# Patient Record
Sex: FEMALE | Race: BLACK OR AFRICAN AMERICAN | Employment: OTHER | URBAN - METROPOLITAN AREA
[De-identification: names, ages, dates, MRNs, and addresses within clinical notes are randomized per-mention and may not be internally consistent; named-entity substitution may affect disease eponyms.]

---

## 2019-10-26 ENCOUNTER — IP HISTORICAL/CONVERTED ENCOUNTER (OUTPATIENT)
Dept: OTHER | Age: 64
End: 2019-10-26

## 2021-03-24 PROBLEM — E11.9 DIABETES MELLITUS TYPE 2, CONTROLLED (HCC): Status: ACTIVE | Noted: 2021-03-24

## 2021-03-24 PROBLEM — H93.8X9 EAR PRESSURE: Status: ACTIVE | Noted: 2021-03-24

## 2021-03-24 PROBLEM — N18.6 ESRD (END STAGE RENAL DISEASE) ON DIALYSIS (HCC): Status: ACTIVE | Noted: 2021-03-24

## 2021-03-24 PROBLEM — M10.9 GOUT: Status: ACTIVE | Noted: 2021-03-24

## 2021-03-24 PROBLEM — E78.00 HYPERCHOLESTEROLEMIA: Status: ACTIVE | Noted: 2021-03-24

## 2021-03-24 PROBLEM — I10 HTN (HYPERTENSION): Status: ACTIVE | Noted: 2021-03-24

## 2021-03-24 PROBLEM — Z86.69 HISTORY OF RECURRENT EAR INFECTION: Status: ACTIVE | Noted: 2021-03-24

## 2021-03-24 PROBLEM — J34.89 SINUS DRAINAGE: Status: ACTIVE | Noted: 2021-03-24

## 2021-03-24 PROBLEM — E66.9 OBESITY: Status: ACTIVE | Noted: 2021-03-24

## 2021-03-24 PROBLEM — Z99.2 ESRD (END STAGE RENAL DISEASE) ON DIALYSIS (HCC): Status: ACTIVE | Noted: 2021-03-24

## 2021-03-24 PROBLEM — M25.561 CHRONIC PAIN OF BOTH KNEES: Status: ACTIVE | Noted: 2021-03-24

## 2021-03-24 PROBLEM — G89.29 CHRONIC PAIN OF BOTH KNEES: Status: ACTIVE | Noted: 2021-03-24

## 2021-03-24 PROBLEM — K21.9 GERD (GASTROESOPHAGEAL REFLUX DISEASE): Status: ACTIVE | Noted: 2021-03-24

## 2021-03-24 PROBLEM — M25.562 CHRONIC PAIN OF BOTH KNEES: Status: ACTIVE | Noted: 2021-03-24

## 2021-03-24 RX ORDER — ATORVASTATIN CALCIUM 40 MG/1
40 TABLET, FILM COATED ORAL DAILY
COMMUNITY

## 2021-03-24 RX ORDER — ALPRAZOLAM 0.25 MG/1
0.25 TABLET ORAL
COMMUNITY

## 2021-03-24 RX ORDER — HYDROXYZINE HYDROCHLORIDE 10 MG/1
10 TABLET, FILM COATED ORAL
COMMUNITY
End: 2022-01-20

## 2021-03-24 RX ORDER — AMIODARONE HYDROCHLORIDE 100 MG/1
100 TABLET ORAL DAILY
COMMUNITY
End: 2022-01-20

## 2021-03-24 RX ORDER — FAMOTIDINE 40 MG/1
40 TABLET, FILM COATED ORAL DAILY
COMMUNITY

## 2021-03-24 RX ORDER — CALCITRIOL 0.25 UG/1
0.25 CAPSULE ORAL DAILY
COMMUNITY

## 2021-03-24 RX ORDER — FLUTICASONE PROPIONATE 50 MCG
2 SPRAY, SUSPENSION (ML) NASAL DAILY
COMMUNITY
End: 2021-07-13

## 2021-03-24 RX ORDER — ASPIRIN 81 MG/1
81 TABLET ORAL DAILY
COMMUNITY

## 2021-03-24 RX ORDER — CODEINE PHOSPHATE AND GUAIFENESIN 10; 100 MG/5ML; MG/5ML
5 SOLUTION ORAL
COMMUNITY
End: 2021-05-18 | Stop reason: ALTCHOICE

## 2021-03-24 RX ORDER — CLOPIDOGREL BISULFATE 75 MG/1
75 TABLET ORAL
COMMUNITY
End: 2022-01-20

## 2021-03-24 RX ORDER — CLONIDINE HYDROCHLORIDE 0.1 MG/1
0.1 TABLET ORAL 2 TIMES DAILY
COMMUNITY

## 2021-03-24 RX ORDER — AMOXICILLIN 250 MG/1
250 CAPSULE ORAL 3 TIMES DAILY
COMMUNITY
End: 2021-05-18 | Stop reason: ALTCHOICE

## 2021-04-06 ENCOUNTER — OFFICE VISIT (OUTPATIENT)
Dept: ENT CLINIC | Age: 66
End: 2021-04-06
Payer: MEDICAID

## 2021-04-06 ENCOUNTER — TELEPHONE (OUTPATIENT)
Dept: ENT CLINIC | Age: 66
End: 2021-04-06

## 2021-04-06 VITALS
HEART RATE: 71 BPM | SYSTOLIC BLOOD PRESSURE: 138 MMHG | OXYGEN SATURATION: 98 % | TEMPERATURE: 96.9 F | BODY MASS INDEX: 27.38 KG/M2 | WEIGHT: 170.4 LBS | HEIGHT: 66 IN | DIASTOLIC BLOOD PRESSURE: 64 MMHG | RESPIRATION RATE: 16 BRPM

## 2021-04-06 DIAGNOSIS — H92.11 OTORRHEA, RIGHT EAR: Primary | ICD-10-CM

## 2021-04-06 DIAGNOSIS — J30.9 ALLERGIC RHINITIS, UNSPECIFIED SEASONALITY, UNSPECIFIED TRIGGER: ICD-10-CM

## 2021-04-06 DIAGNOSIS — Z96.22 HISTORY OF TYMPANOSTOMY TUBE PLACEMENT: ICD-10-CM

## 2021-04-06 DIAGNOSIS — H69.81 ETD (EUSTACHIAN TUBE DYSFUNCTION), RIGHT: ICD-10-CM

## 2021-04-06 PROCEDURE — 99203 OFFICE O/P NEW LOW 30 MIN: CPT | Performed by: OTOLARYNGOLOGY

## 2021-04-06 RX ORDER — OFLOXACIN 3 MG/ML
4-5 SOLUTION AURICULAR (OTIC) 2 TIMES DAILY
Qty: 5 ML | Refills: 0 | Status: SHIPPED | OUTPATIENT
Start: 2021-04-06 | End: 2021-04-16

## 2021-04-06 RX ORDER — AZELASTINE 1 MG/ML
1 SPRAY, METERED NASAL 2 TIMES DAILY
Qty: 1 BOTTLE | Refills: 1 | Status: SHIPPED | OUTPATIENT
Start: 2021-04-06 | End: 2021-04-07

## 2021-04-06 NOTE — PROGRESS NOTES
Otolaryngology-Head and Neck Surgery  New Patient Visit     Patient: Sam Douglass  YOB: 1955  MRN: [id number]  Date of Service: 4/6/2021    Chief Complaint:  R ear issues     History of Present Illness: Sam Douglass is a 76y.o. year old female who presents today for discussion of her ears. Notes a history of recurring ear infections and redness in her right ear. Was seen by another ENT (in [facility]) and underwent R ear tube about 6 months ago.  Since then she describes R otorrhea, occasional otalgia, and plugging/popping in her ears    She finds this frustrating and is therefore here for another opinion    Has some left sided symptoms but generally mild    Had an audiogram she believes prior to the right ear tube    No other prior surgeries    Has year round allergies   Takes claritin - PRN   Flonase - not really helping      Past Medical History:  Past Medical History:   Diagnosis Date    Chronic pain of both knees 3/24/2021    Diabetes mellitus type 2, controlled ([facility] [address] 3/24/2021    ESRD (end stage renal disease) on dialysis ([facility] [address] 3/24/2021    GERD (gastroesophageal reflux disease) 3/24/2021    Gout 3/24/2021    HTN (hypertension) 3/24/2021    Hypercholesterolemia 3/24/2021    Obesity 3/24/2021    Reflux gastritis      Past Surgical History:   Past Surgical History:   Procedure Laterality Date    HX MYRINGOTOMY         Medications:   Current Outpatient Medications   Medication Instructions    ALPRAZolam (XANAX) 0.25 mg, Oral    amiodarone (PACERONE) 100 mg, Oral, DAILY    amoxicillin (AMOXIL) 250 mg, Oral, 3 TIMES DAILY    aspirin delayed-release 81 mg, Oral, DAILY    atorvastatin (LIPITOR) 40 mg, Oral, DAILY    calcitRIOL (ROCALTROL) 0.25 mcg, Oral, DAILY    cloNIDine HCL (CATAPRES) 0.1 mg, Oral, 2 TIMES DAILY    clopidogreL (PLAVIX) 75 mg, Oral    famotidine (PEPCID) 40 mg, Oral, DAILY    fluticasone propionate (Flonase Allergy Relief) 50 mcg/actuation nasal spray 2 Sprays, Both Nostrils, DAILY    guaiFENesin-codeine (ROBITUSSIN AC) 100-10 mg/5 mL solution 5 mL, Oral, 3 TIMES DAILY AS NEEDED    hydrOXYzine HCL (ATARAX) 10 mg, Oral, 3 TIMES DAILY AS NEEDED       Allergies: Allergies   Allergen Reactions    Sulfa (Sulfonamide Antibiotics) Other (comments)       Social History:   Social History     Socioeconomic History    Marital status: SINGLE     Spouse name: Not on file    Number of children: Not on file    Years of education: Not on file    Highest education level: Not on file   Tobacco Use    Smoking status: Never Smoker    Smokeless tobacco: Never Used   Substance and Sexual Activity    Alcohol use: Not Currently    Drug use: Never       Family History:  History reviewed. No pertinent family history. Review of Systems:    Consitutional: denies fever, excessive weight gain or loss. Eyes: denies diplopia, eye pain. Integumentary: denies new concerning skin lesions. Ears, Nose, Mouth, Throat: denies except as per HPI. Endocrine: denies hot or cold intolerance, increased thirst.  Respiratory: denies cough, hemoptysis, wheezing  Gastrointestinal: denies trouble swallowing, nausea, emesis, regurgitation  Musculoskeletal: denies muscle weakness or wasting  Cardiovascular: denies chest pain, shortness of breath  Neurologic: denies seizures, numbness or tingling, syncope  Hematologic: denies easy bleeding or bruising    Physical Examination:   Vitals:    04/06/21 1109   BP: 138/64   BP 1 Location: Right upper arm   BP Patient Position: Sitting   BP Cuff Size: Adult   Pulse: 71   Resp: 16   Temp: 96.9 °F (36.1 °C)   SpO2: 98%   Weight: 170 lb 6.4 oz (77.3 kg)   Height: 5' 6\" (1.676 m)        General: Comfortable, pleasant, appears stated age  Voice: Strong, speaking in full sentences, no stridor    Face: No masses or lesions, facial strength symmetric   Ears: External ears unremarkable. L ear canal clear. Tympanic membrane clear and intact, with visible landmarks. Clear middle ear space. R TM with patent tube, some mucoid otorrhea. Middle ear space appears clear. Nose: External nose unremarkable. Dorsum midline. Anterior rhinoscopy demonstrates no lesions. Septum midline. Turbinates without hypertrophy. Oral Cavity / Oropharynx: No trismus. Mucosa pink and moist. No lesions. Tongue is midline and mobile. Palate elevates symmetrically. Uvula midline. Tonsils unremarkable. Base of tongue soft. Floor of mouth soft. Neck: Supple. No adenopathy. Thyroid unremarkable. Palpable laryngeal landmarks. Full neck range of motion   Neurologic: CN II - XI intact. Normal gait      Assessment and Plan:   1. History of R ear tube  2. R ETD  3. Allergic rhinitis  4. R otorrhea  - Tube in place with associated mucoid otorrhea  - Add floxin BID x 10 days  - Dry ear precautions  - Will see if better allergy control also improves ear symptoms  - Add astelin nasal spray in addition to PO antihistamine. She may or may not continue the flonase as she does not find this helps  - Follow up in 2-3 weeks for recheck  - She notes likely having nasopharyngoscopy which was probably benign but its unclear from history, pending progress, may want to get records from prior ENT including audio and scope, otherwise can repeat        The patient was instructed to return to clinic if no improvement or progression of symptoms. Signs to watch out for reviewed.       MD Raya Sadler [facility]  [address] [place] [address]  Office Phone: [phone]

## 2021-04-06 NOTE — PROGRESS NOTES
Chief Complaint   Patient presents with   [address] [place] [address] Patient    Ear Drainage     right    Ear Pain     right     Visit Vitals  /64 (BP 1 Location: Right upper arm, BP Patient Position: Sitting, BP Cuff Size: Adult)   Pulse 71   Temp 96.9 °F (36.1 °C)   Resp 16   Ht 5' 6\" (1.676 m)   Wt 170 lb 6.4 oz (77.3 kg)   SpO2 98%   BMI 27.50 kg/m²

## 2021-04-07 RX ORDER — AZELASTINE 1 MG/ML
1 SPRAY, METERED NASAL 2 TIMES DAILY
Qty: 1 BOTTLE | Refills: 1 | Status: SHIPPED | OUTPATIENT
Start: 2021-04-07 | End: 2021-07-13 | Stop reason: SDUPTHER

## 2021-05-18 ENCOUNTER — OFFICE VISIT (OUTPATIENT)
Dept: ENT CLINIC | Age: 66
End: 2021-05-18
Payer: MEDICAID

## 2021-05-18 VITALS
SYSTOLIC BLOOD PRESSURE: 130 MMHG | HEART RATE: 63 BPM | BODY MASS INDEX: 26.68 KG/M2 | TEMPERATURE: 97.8 F | RESPIRATION RATE: 18 BRPM | DIASTOLIC BLOOD PRESSURE: 60 MMHG | OXYGEN SATURATION: 98 % | HEIGHT: 66 IN | WEIGHT: 166 LBS

## 2021-05-18 DIAGNOSIS — J30.9 ALLERGIC RHINITIS, UNSPECIFIED SEASONALITY, UNSPECIFIED TRIGGER: ICD-10-CM

## 2021-05-18 DIAGNOSIS — H69.81 ETD (EUSTACHIAN TUBE DYSFUNCTION), RIGHT: ICD-10-CM

## 2021-05-18 DIAGNOSIS — H92.11 OTORRHEA, RIGHT EAR: Primary | ICD-10-CM

## 2021-05-18 DIAGNOSIS — Z96.22 HISTORY OF TYMPANOSTOMY TUBE PLACEMENT: ICD-10-CM

## 2021-05-18 PROCEDURE — 99213 OFFICE O/P EST LOW 20 MIN: CPT | Performed by: OTOLARYNGOLOGY

## 2021-05-18 RX ORDER — MINERAL OIL
180 ENEMA (ML) RECTAL
Qty: 30 TAB | Refills: 1 | Status: SHIPPED | OUTPATIENT
Start: 2021-05-18 | End: 2021-10-26

## 2021-05-18 RX ORDER — SODIUM CHLORIDE 0.65 %
1 AEROSOL, SPRAY (ML) NASAL AS NEEDED
Qty: 30 ML | Refills: 1 | Status: SHIPPED | OUTPATIENT
Start: 2021-05-18 | End: 2022-01-20

## 2021-05-18 NOTE — PROGRESS NOTES
Otolaryngology-Head and Neck Surgery  Follow Up Patient Visit     Patient: Stephanie Grande  YOB: 1955  MRN: [id number]  Date of Service: 5/18/2021    Chief Complaint:  R ear issues     Interval hx  She notes completion of floxin and has been using astelin  Did find astelin to be helpful but seemed it was drying to her nose  Still notices wet drainage on cotton ball from R ear when she wakes up  Denies pain  Allergies have been severe in last month     History of Present Illness: Stephanie Grande is a 76y.o. year old female who presents today for discussion of her ears. Notes a history of recurring ear infections and redness in her right ear. Was seen by another ENT (in [facility]) and underwent R ear tube about 6 months ago.  Since then she describes R otorrhea, occasional otalgia, and plugging/popping in her ears    She finds this frustrating and is therefore here for another opinion    Has some left sided symptoms but generally mild    Had an audiogram she believes prior to the right ear tube    No other prior surgeries    Has year round allergies   Takes claritin - PRN   Flonase - not really helping      Past Medical History:  Past Medical History:   Diagnosis Date    Chronic pain of both knees 3/24/2021    Diabetes mellitus type 2, controlled ([address] 3/24/2021    ESRD (end stage renal disease) on dialysis ([facility] [address] 3/24/2021    GERD (gastroesophageal reflux disease) 3/24/2021    Gout 3/24/2021    HTN (hypertension) 3/24/2021    Hypercholesterolemia 3/24/2021    Obesity 3/24/2021    Reflux gastritis      Past Surgical History:   Past Surgical History:   Procedure Laterality Date    HX MYRINGOTOMY         Medications:   Current Outpatient Medications   Medication Instructions    ALPRAZolam (XANAX) 0.25 mg, Oral    amiodarone (PACERONE) 100 mg, Oral, DAILY    aspirin delayed-release 81 mg, Oral, DAILY    atorvastatin (LIPITOR) 40 mg, Oral, DAILY    azelastine (ASTELIN) 137 mcg (0.1 %) nasal spray 1 Spray, Both Nostrils, 2 TIMES DAILY, Start once nightly, increase to twice daily if helpful. Use in each nostril as directed    calcitRIOL (ROCALTROL) 0.25 mcg, Oral, DAILY    cloNIDine HCL (CATAPRES) 0.1 mg, Oral, 2 TIMES DAILY    clopidogreL (PLAVIX) 75 mg, Oral    famotidine (PEPCID) 40 mg, Oral, DAILY    fluticasone propionate (Flonase Allergy Relief) 50 mcg/actuation nasal spray 2 Sprays, Both Nostrils, DAILY    hydrOXYzine HCL (ATARAX) 10 mg, Oral, 3 TIMES DAILY AS NEEDED       Allergies: Allergies   Allergen Reactions    Sulfa (Sulfonamide Antibiotics) Other (comments)       Social History:   Social History     Socioeconomic History    Marital status: SINGLE     Spouse name: Not on file    Number of children: Not on file    Years of education: Not on file    Highest education level: Not on file   Tobacco Use    Smoking status: Never Smoker    Smokeless tobacco: Never Used   Substance and Sexual Activity    Alcohol use: Not Currently    Drug use: Never       Family History:  No family history on file. Review of Systems:    Consitutional: denies fever, excessive weight gain or loss. Eyes: denies diplopia, eye pain. Integumentary: denies new concerning skin lesions. Ears, Nose, Mouth, Throat: denies except as per HPI.   Endocrine: denies hot or cold intolerance, increased thirst.  Respiratory: denies cough, hemoptysis, wheezing  Gastrointestinal: denies trouble swallowing, nausea, emesis, regurgitation  Musculoskeletal: denies muscle weakness or wasting  Cardiovascular: denies chest pain, shortness of breath  Neurologic: denies seizures, numbness or tingling, syncope  Hematologic: denies easy bleeding or bruising    Physical Examination:   Vitals:    05/18/21 1131   BP: 130/60   BP 1 Location: Right upper arm   BP Patient Position: Sitting   BP Cuff Size: Large adult   Pulse: 63   Resp: 18   Temp: 97.8 °F (36.6 °C)   TempSrc: Temporal   SpO2: 98%   Weight: 166 lb (75.3 kg)   Height: 5' 6\" (1.676 m)        General: Comfortable, pleasant, appears stated age  Voice: Strong, speaking in full sentences, no stridor    Face: No masses or lesions, facial strength symmetric   Ears: External ears unremarkable. L ear canal clear. Tympanic membrane clear and intact, with visible landmarks. Clear middle ear space. R TM with patent tube, no otorrhea noted today. Middle ear space appears dry and clear. Nose: External nose unremarkable. Dorsum midline. Anterior rhinoscopy demonstrates no lesions. Septum midline. Turbinates without hypertrophy. Oral Cavity / Oropharynx: No trismus. Mucosa pink and moist. No lesions. Tongue is midline and mobile. Palate elevates symmetrically. Uvula midline. Tonsils unremarkable. Base of tongue soft. Floor of mouth soft. Neck: Supple. No adenopathy. Thyroid unremarkable. Palpable laryngeal landmarks. Full neck range of motion   Neurologic: CN II - XI intact. Normal gait      Assessment and Plan:   1. History of R ear tube  2. R ETD  3. Allergic rhinitis  4. R otorrhea  -Resolution of previously noted otorrhea though she does still feel the ear is wet. Tube appearance patent and functional  - Dry ear precautions  -Continue Astelin nasal spray once daily. Use nasal saline spray as needed for moisture.    -We will try Allegra. She notes difficulty with sedation with antihistamines. Can trial half tablet first  - Follow up In 6 to 8 weeks, Sooner if any issues   - She notes likely having nasopharyngoscopy which was probably benign but its unclear from history, pending progress, may want to get records from prior ENT including audio and scope, otherwise can repeat        The patient was instructed to return to clinic if no improvement or progression of symptoms. Signs to watch out for reviewed.       MD Raya Gómez [facility]  [address] [place] [address] [address]  [facility]  Office Phone: [phone]

## 2021-07-13 ENCOUNTER — OFFICE VISIT (OUTPATIENT)
Dept: ENT CLINIC | Age: 66
End: 2021-07-13
Payer: MEDICAID

## 2021-07-13 VITALS
BODY MASS INDEX: 26.78 KG/M2 | WEIGHT: 166.6 LBS | DIASTOLIC BLOOD PRESSURE: 58 MMHG | OXYGEN SATURATION: 97 % | TEMPERATURE: 96.6 F | RESPIRATION RATE: 14 BRPM | HEIGHT: 66 IN | HEART RATE: 56 BPM | SYSTOLIC BLOOD PRESSURE: 120 MMHG

## 2021-07-13 DIAGNOSIS — H92.11 OTORRHEA, RIGHT EAR: Primary | ICD-10-CM

## 2021-07-13 PROCEDURE — 99213 OFFICE O/P EST LOW 20 MIN: CPT | Performed by: OTOLARYNGOLOGY

## 2021-07-13 RX ORDER — FLUTICASONE PROPIONATE 50 MCG
2 SPRAY, SUSPENSION (ML) NASAL DAILY
Qty: 1 BOTTLE | Refills: 1 | Status: SHIPPED | OUTPATIENT
Start: 2021-07-13

## 2021-07-13 RX ORDER — AZELASTINE 1 MG/ML
1 SPRAY, METERED NASAL DAILY
Qty: 1 BOTTLE | Refills: 1 | Status: SHIPPED | OUTPATIENT
Start: 2021-07-13

## 2021-07-13 NOTE — PROGRESS NOTES
Otolaryngology-Head and Neck Surgery  Follow Up Patient Visit     Patient: Tony Harper  YOB: 1955  MRN: [id number]  Date of Service: 7/13/2021    Chief Complaint:  Follow up R ear     Interval hx  Completed a course of floxin after initial meeting  Has been using astelin but ran out - helped but nose felt sore  Feels flonase does help but has some trouble with insurance coverage    Notes ear continues to be wet   No pain      History of Present Illness: Tony Harper is a 78y.o. year old female who presents today for discussion of her ears. Notes a history of recurring ear infections and redness in her right ear. Was seen by another ENT (in [facility]) and underwent R ear tube about 6 months ago.  Since then she describes R otorrhea, occasional otalgia, and plugging/popping in her ears    She finds this frustrating and is therefore here for another opinion    Has some left sided symptoms but generally mild    Had an audiogram she believes prior to the right ear tube    No other prior surgeries    Has year round allergies   Takes claritin - PRN   Flonase - not really helping      Past Medical History:  Past Medical History:   Diagnosis Date    Chronic pain of both knees 3/24/2021    Diabetes mellitus type 2, controlled ([address] 3/24/2021    ESRD (end stage renal disease) on dialysis ([facility] [address] 3/24/2021    GERD (gastroesophageal reflux disease) 3/24/2021    Gout 3/24/2021    HTN (hypertension) 3/24/2021    Hypercholesterolemia 3/24/2021    Obesity 3/24/2021    Reflux gastritis      Past Surgical History:   Past Surgical History:   Procedure Laterality Date    HX MYRINGOTOMY         Medications:   Current Outpatient Medications   Medication Instructions    ALPRAZolam (XANAX) 0.25 mg, Oral    amiodarone (PACERONE) 100 mg, Oral, DAILY    aspirin delayed-release 81 mg, Oral, DAILY    atorvastatin (LIPITOR) 40 mg, Oral, DAILY    azelastine (ASTELIN) 137 mcg (0.1 %) nasal spray 1 Spray, Both Nostrils, 2 TIMES DAILY, Start once nightly, increase to twice daily if helpful. Use in each nostril as directed    calcitRIOL (ROCALTROL) 0.25 mcg, Oral, DAILY    cloNIDine HCL (CATAPRES) 0.1 mg, Oral, 2 TIMES DAILY    clopidogreL (PLAVIX) 75 mg, Oral    famotidine (PEPCID) 40 mg, Oral, DAILY    fexofenadine (ALLEGRA) 180 mg, Oral, DAILY AS NEEDED, Can start with half tab first    fluticasone propionate (Flonase Allergy Relief) 50 mcg/actuation nasal spray 2 Sprays, Both Nostrils, DAILY    hydrOXYzine HCL (ATARAX) 10 mg, Oral, 3 TIMES DAILY AS NEEDED    sodium chloride (Saline Mist) 0.65 % nasal squeeze bottle 1 Spray, Both Nostrils, AS NEEDED       Allergies: Allergies   Allergen Reactions    Sulfa (Sulfonamide Antibiotics) Other (comments)       Social History:   Social History     Socioeconomic History    Marital status: SINGLE     Spouse name: Not on file    Number of children: Not on file    Years of education: Not on file    Highest education level: Not on file   Tobacco Use    Smoking status: Never Smoker    Smokeless tobacco: Never Used   Vaping Use    Vaping Use: Never used   Substance and Sexual Activity    Alcohol use: Not Currently    Drug use: Never     Social Determinants of Health     Financial Resource Strain:     Difficulty of Paying Living Expenses:    Food Insecurity:     Worried About Running Out of Food in the Last Year:     [address] in the Last Year:    Transportation Needs:     Lack of Transportation (Medical):      Lack of Transportation (Non-Medical):    Physical Activity:     Days of Exercise per Week:     Minutes of Exercise per Session:    Stress:     Feeling of Stress :    Social Connections:     Frequency of Communication with Friends and Family:     Frequency of Social Gatherings with Friends and Family:     Attends [employer] Services:     Active Member of Clubs or Organizations:     Attends Club or Organization Meetings:     Marital Status: Family History:  No family history on file. Review of Systems:    Consitutional: denies fever, excessive weight gain or loss. Eyes: denies diplopia, eye pain. Integumentary: denies new concerning skin lesions. Ears, Nose, Mouth, Throat: denies except as per HPI. Endocrine: denies hot or cold intolerance, increased thirst.  Respiratory: denies cough, hemoptysis, wheezing  Gastrointestinal: denies trouble swallowing, nausea, emesis, regurgitation  Musculoskeletal: denies muscle weakness or wasting  Cardiovascular: denies chest pain, shortness of breath  Neurologic: denies seizures, numbness or tingling, syncope  Hematologic: denies easy bleeding or bruising    Physical Examination:   There were no vitals filed for this visit. General: Comfortable, pleasant, appears stated age  Voice: Strong, speaking in full sentences, no stridor    Face: No masses or lesions, facial strength symmetric   Ears: External ears unremarkable. L ear canal clear. Tympanic membrane clear and intact, with visible landmarks. Clear middle ear space. R TM with patent tube, again with mucoid otorrhea. Nose: External nose unremarkable. Dorsum midline. Anterior rhinoscopy demonstrates no lesions. Septum midline. Turbinates without hypertrophy. Oral Cavity / Oropharynx: No trismus. Mucosa pink and moist. No lesions. Tongue is midline and mobile. Palate elevates symmetrically. Uvula midline. Tonsils unremarkable. Base of tongue soft. Floor of mouth soft. Neck: Supple. No adenopathy. Thyroid unremarkable. Palpable laryngeal landmarks. Full neck range of motion   Neurologic: CN II - XI intact. Normal gait      Assessment and Plan:   1. History of R ear tube  2. R ETD  3. Allergic rhinitis  4. R otorrhea  - Again with right mucoid otorrhea  - Culture obtained today - not purulent in appearance  - Consider decadron ear drops or boric acid pending cx results  - Dry ear precautions  -Continue Astelin nasal spray, will Rx flonase.   Use nasal saline spray as needed for moisture. - Will arrange allergy testing  - At some point consider removal of tube but she may just have recurrence of effusion or persistent TM perforation if uncontrolled allergies are driving the otorrhea, vs just the tube itself  - She notes likely having nasopharyngoscopy which was probably benign but its unclear from history, pending progress, may want to get records from prior ENT including audio and scope, otherwise can repeat        The patient was instructed to return to clinic if no improvement or progression of symptoms. Signs to watch out for reviewed.       MD [facility]  [address] [place] [address]  Office Phone: [phone]

## 2021-07-13 NOTE — PROGRESS NOTES
1. Have you been to the ER, urgent care clinic since your last visit? Hospitalized since your last visit? NO    2. Have you seen or consulted any other health care providers outside of the [address] [place] [address] since your last visit? Include any pap smears or colon screening.  NO   Chief Complaint   Patient presents with    Follow-up     Visit Vitals  BP (!) 120/58 (BP 1 Location: Right upper arm, BP Patient Position: Sitting, BP Cuff Size: Adult)   Pulse (!) 56   Temp (!) 96.6 °F (35.9 °C) (Temporal)   Resp 14   Ht 5' 6\" (1.676 m)   Wt 75.6 kg (166 lb 9.6 oz)   SpO2 97% Comment: room air   BMI 26.89 kg/m²

## 2021-07-19 LAB
BACTERIA SPEC AEROBE CULT: NORMAL
BACTERIA SPEC ANAEROBE CULT: NORMAL
SPECIMEN STATUS REPORT, ROLRST: NORMAL

## 2021-07-20 LAB
BACTERIA SPEC AEROBE CULT: ABNORMAL
BACTERIA SPEC AEROBE CULT: ABNORMAL
BACTERIA SPEC ANAEROBE CULT: ABNORMAL
SPECIMEN STATUS REPORT, ROLRST: NORMAL

## 2021-07-30 ENCOUNTER — TELEPHONE (OUTPATIENT)
Dept: ENT CLINIC | Age: 66
End: 2021-07-30

## 2021-07-30 NOTE — TELEPHONE ENCOUNTER
Calledpt to discuss cx results, no answer left VM for call back    MD Raya Barajas [facility]  [address] [place] [address] 6  [facility]  Office Phone: [phone]

## 2021-10-26 ENCOUNTER — OFFICE VISIT (OUTPATIENT)
Dept: ENT CLINIC | Age: 66
End: 2021-10-26

## 2021-10-26 ENCOUNTER — OFFICE VISIT (OUTPATIENT)
Dept: ENT CLINIC | Age: 66
End: 2021-10-26
Payer: MEDICARE

## 2021-10-26 VITALS — SYSTOLIC BLOOD PRESSURE: 118 MMHG | OXYGEN SATURATION: 97 % | HEART RATE: 59 BPM | DIASTOLIC BLOOD PRESSURE: 58 MMHG

## 2021-10-26 DIAGNOSIS — Z96.22 HISTORY OF TYMPANOSTOMY TUBE PLACEMENT: Primary | ICD-10-CM

## 2021-10-26 DIAGNOSIS — H92.11 OTORRHEA, RIGHT EAR: ICD-10-CM

## 2021-10-26 DIAGNOSIS — J30.89 ALLERGIC RHINITIS DUE TO OTHER ALLERGIC TRIGGER, UNSPECIFIED SEASONALITY: ICD-10-CM

## 2021-10-26 DIAGNOSIS — J30.89 ALLERGIC RHINITIS DUE TO OTHER ALLERGIC TRIGGER, UNSPECIFIED SEASONALITY: Primary | ICD-10-CM

## 2021-10-26 PROCEDURE — 95024 IQ TESTS W/ALLERGENIC XTRCS: CPT | Performed by: NURSE PRACTITIONER

## 2021-10-26 PROCEDURE — 99214 OFFICE O/P EST MOD 30 MIN: CPT | Performed by: OTOLARYNGOLOGY

## 2021-10-26 PROCEDURE — 95004 PERQ TESTS W/ALRGNC XTRCS: CPT | Performed by: NURSE PRACTITIONER

## 2021-10-26 RX ORDER — DOXYCYCLINE 100 MG/1
100 TABLET ORAL 2 TIMES DAILY
Qty: 20 TABLET | Refills: 0 | Status: SHIPPED | OUTPATIENT
Start: 2021-10-26 | End: 2021-11-05

## 2021-10-26 RX ORDER — CETIRIZINE HCL 10 MG
10 TABLET ORAL
Qty: 30 TABLET | Refills: 1 | Status: SHIPPED | OUTPATIENT
Start: 2021-10-26 | End: 2022-01-20

## 2021-10-26 NOTE — PROGRESS NOTES
Otolaryngology-Head and Neck Surgery  Follow Up Patient Visit     Patient: Ceci Martinez  YOB: 1955  MRN: [id number]  Date of Service: 10/26/2021    Chief Complaint:  Follow up R ear, allergy test    Interval hx  Underwent allergy testing   Used astelin but finds this irritates nasal lining  Has continued otorrhea and mild R otalgia off an on       History of Present Illness: Ceci Martinez is a 78y.o. year old female who presents today for discussion of her ears. Notes a history of recurring ear infections and redness in her right ear. Was seen by another ENT (in [facility]) and underwent R ear tube about 6 months ago.  Since then she describes R otorrhea, occasional otalgia, and plugging/popping in her ears    She finds this frustrating and is therefore here for another opinion    Has some left sided symptoms but generally mild    Had an audiogram she believes prior to the right ear tube    No other prior surgeries    Has year round allergies   Takes claritin - PRN   Flonase - not really helping      Past Medical History:  Past Medical History:   Diagnosis Date    Chronic pain of both knees 3/24/2021    Diabetes mellitus type 2, controlled ([address] 3/24/2021    ESRD (end stage renal disease) on dialysis ([facility] [address] [address] 3/24/2021    GERD (gastroesophageal reflux disease) 3/24/2021    Gout 3/24/2021    HTN (hypertension) 3/24/2021    Hypercholesterolemia 3/24/2021    Obesity 3/24/2021    Reflux gastritis      Past Surgical History:   Past Surgical History:   Procedure Laterality Date    HX MYRINGOTOMY         Medications:   Current Outpatient Medications   Medication Instructions    ALPRAZolam (XANAX) 0.25 mg, Oral    amiodarone (PACERONE) 100 mg, Oral, DAILY    aspirin delayed-release 81 mg, Oral, DAILY    atorvastatin (LIPITOR) 40 mg, Oral, DAILY    azelastine (ASTELIN) 137 mcg (0.1 %) nasal spray 1 [place], Both Nostrils, DAILY    calcitRIOL (ROCALTROL) 0.25 mcg, Oral, DAILY    cloNIDine HCL (CATAPRES) 0.1 mg, Oral, 2 TIMES DAILY    clopidogreL (PLAVIX) 75 mg, Oral    famotidine (PEPCID) 40 mg, Oral, DAILY    fexofenadine (ALLEGRA) 180 mg, Oral, DAILY AS NEEDED, Can start with half tab first    fluticasone propionate (FLONASE) 50 mcg/actuation nasal spray 2 Sprays, Both Nostrils, DAILY    hydrOXYzine HCL (ATARAX) 10 mg, Oral, 3 TIMES DAILY AS NEEDED    sodium chloride (Saline Mist) 0.65 % nasal squeeze bottle 1 Spray, Both Nostrils, AS NEEDED       Allergies: Allergies   Allergen Reactions    Sulfa (Sulfonamide Antibiotics) Other (comments)       Social History:   Social History     Socioeconomic History    Marital status: SINGLE     Spouse name: Not on file    Number of children: Not on file    Years of education: Not on file    Highest education level: Not on file   Tobacco Use    Smoking status: Never Smoker    Smokeless tobacco: Never Used   Vaping Use    Vaping Use: Never used   Substance and Sexual Activity    Alcohol use: Not Currently    Drug use: Never     Social Determinants of Health     Financial Resource Strain:     Difficulty of Paying Living Expenses:    Food Insecurity:     Worried About Running Out of Food in the Last Year:     [address] in the Last Year:    Transportation Needs:     Lack of Transportation (Medical):      Lack of Transportation (Non-Medical):    Physical Activity:     Days of Exercise per Week:     Minutes of Exercise per Session:    Stress:     Feeling of Stress :    Social Connections:     Frequency of Communication with Friends and Family:     Frequency of Social Gatherings with Friends and Family:     Attends Congregational Services:     Active Member of Clubs or Organizations:     Attends Club or Organization Meetings:     Marital Status:        Family History:  Family History   Problem Relation Age of Onset    Hypertension Other     Heart Disease Other     Diabetes Other        Review of Systems:    Consitutional: denies fever, excessive weight gain or loss. Eyes: denies diplopia, eye pain. Integumentary: denies new concerning skin lesions. Ears, Nose, Mouth, Throat: denies except as per HPI. Endocrine: denies hot or cold intolerance, increased thirst.  Respiratory: denies cough, hemoptysis, wheezing  Gastrointestinal: denies trouble swallowing, nausea, emesis, regurgitation  Musculoskeletal: denies muscle weakness or wasting  Cardiovascular: denies chest pain, shortness of breath  Neurologic: denies seizures, numbness or tingling, syncope  Hematologic: denies easy bleeding or bruising    Physical Examination:   There were no vitals filed for this visit. General: Comfortable, pleasant, appears stated age  Voice: Strong, speaking in full sentences, no stridor    Face: No masses or lesions, facial strength symmetric   Ears: External ears unremarkable. L ear canal clear. Tympanic membrane clear and intact, with visible landmarks. Clear middle ear space. R TM with patent tube, again with mucoid otorrhea. Nose: External nose unremarkable. Dorsum midline. Anterior rhinoscopy demonstrates no lesions. Septum midline. Turbinates without hypertrophy. Oral Cavity / Oropharynx: No trismus. Mucosa pink and moist. No lesions. Tongue is midline and mobile. Palate elevates symmetrically. Uvula midline. Tonsils unremarkable. Base of tongue soft. Floor of mouth soft. Neck: Supple. No adenopathy. Thyroid unremarkable. Palpable laryngeal landmarks. Full neck range of motion   Neurologic: CN II - XI intact.  Normal gait    Enterobacter cloacae    Antibiotic Interpretation Value Method Comment   Amoxicillin/Clavulanic A Resistant R ug/mL Not Specified    Cefazolin ($) Resistant R ug/mL Not Specified    Cefepime ($$) Susceptible S ug/mL Not Specified    Cefuroxime ($) Resistant R ug/mL Not Specified    Ciprofloxacin ($) Susceptible S ug/mL Not Specified    Ertapenem ($$$$) Susceptible S ug/mL Not Specified    Gentamicin ($) Susceptible S ug/mL Not Specified    Imipenem Susceptible S ug/mL Not Specified    Levofloxacin ($) Susceptible S ug/mL Not Specified    Meropenem ($$) Susceptible S ug/mL Not Specified    Tetracycline Susceptible S ug/mL Not Specified    Tobramycin ($) Susceptible S ug/mL Not Specified    Trimeth-Sulfamethoxa Susceptible S ug/mL Not Specified        Assessment and Plan:   1. History of R ear tube  2. R ETD  3. Allergic rhinitis  4. R otorrhea  - Again with right mucoid otorrhea  - Temporary improvement with floxin  - Applied boric acid today  - Add PO antibiotics, will trial doxycycline based on cx. She has sulfa allergy and she has several drug interactions with FQ   - Dry ear precautions  - Reviewed allergy testing results  - Trial xyzal instead - unable to due to ESRD; will Rx zyrtec instead  - Follow up in 1 month - if otorrhea improved and remains dry then will leave as is; otherwise might consider removal of tube altogether in case this is etiology of infection   - Plan scope next office visit       The patient was instructed to return to clinic if no improvement or progression of symptoms. Signs to watch out for reviewed.       MD [facility]  [address] [place] [address] [address]  [facility]  Office Phone: [phone]

## 2021-10-26 NOTE — LETTER
10/26/2021    Patient: Nika Lemus   YOB: 1955   Date of Visit: 10/26/2021     Navarrete MD Viktoria  [address] [id number]  Via Fax: [phone]    Dear Navarrete MD Viktoria,      Thank you for referring Ms. Carrie Basilio to [facility] EAR NOSE AND THROAT [facility], THROAT AND ALLERGY CARE for evaluation. My notes for this consultation are attached. If you have questions, please do not hesitate to call me. I look forward to following your patient along with you.       Sincerely,    Nancy Gomez MD

## 2021-11-10 ENCOUNTER — TELEPHONE (OUTPATIENT)
Dept: ENT CLINIC | Age: 66
End: 2021-11-10

## 2021-11-10 NOTE — TELEPHONE ENCOUNTER
Attempted to contact pt on both numbers provided. Mobile phone does not have voicemail set up yet. LVM at number listed as home phone stating pt appt scheduled for 11/30/2021 has been cancelled and instructed pt to return call to reschedule with another provider in the office.

## 2021-11-30 ENCOUNTER — TELEPHONE (OUTPATIENT)
Dept: ENT CLINIC | Age: 66
End: 2021-11-30

## 2021-11-30 NOTE — TELEPHONE ENCOUNTER
Patient came in the office today for appointment that was originally scheduled 11/30 with Dr. Mae Browne and was informed that her appointment was cancelled due to Dr. Mae Browne being out on emergency medical leave. I informed the patient that someone from our office did attempt to reach her but was unable to leave a voicemail due to the voicemail box not being set up. I asked if she would like to be scheduled with one of the other providers because we do not currently have Dr. Kevin Payne availability at this time.  Patient stated she would like to wait until Dr. Mae Browne returns

## 2022-01-20 ENCOUNTER — TELEPHONE (OUTPATIENT)
Dept: ENT CLINIC | Age: 67
End: 2022-01-20

## 2022-01-20 ENCOUNTER — OFFICE VISIT (OUTPATIENT)
Dept: ENT CLINIC | Age: 67
End: 2022-01-20
Payer: MEDICARE

## 2022-01-20 VITALS
BODY MASS INDEX: 28.28 KG/M2 | HEIGHT: 66 IN | WEIGHT: 176 LBS | OXYGEN SATURATION: 99 % | DIASTOLIC BLOOD PRESSURE: 68 MMHG | HEART RATE: 72 BPM | TEMPERATURE: 97.5 F | SYSTOLIC BLOOD PRESSURE: 146 MMHG | RESPIRATION RATE: 18 BRPM

## 2022-01-20 DIAGNOSIS — Z96.22 HISTORY OF TYMPANOSTOMY TUBE PLACEMENT: Primary | ICD-10-CM

## 2022-01-20 DIAGNOSIS — H92.11 OTORRHEA, RIGHT EAR: ICD-10-CM

## 2022-01-20 DIAGNOSIS — H69.81 ETD (EUSTACHIAN TUBE DYSFUNCTION), RIGHT: ICD-10-CM

## 2022-01-20 PROCEDURE — 99213 OFFICE O/P EST LOW 20 MIN: CPT | Performed by: NURSE PRACTITIONER

## 2022-01-20 RX ORDER — OFLOXACIN 3 MG/ML
4 SOLUTION AURICULAR (OTIC) 2 TIMES DAILY
Qty: 10 ML | Refills: 0 | Status: SHIPPED | OUTPATIENT
Start: 2022-01-20 | End: 2022-02-08 | Stop reason: ALTCHOICE

## 2022-01-20 NOTE — PROGRESS NOTES
Otolaryngology-Head and Neck Surgery  Follow Up Patient Visit     Patient: Onel Ji  YOB: 1955  MRN: [id number]  Date of Service:  1/20/2022    Chief Complaint: Right otorrhea    History of Present Illness: Onel Ji is a 78y.o. year old female who was last seen 10/26/21 by Dr. Demi Haynes for right otorrhea. she presents today for   Notes a history of recurring ear infections and redness in her right ear. Was seen by another ENT (in [facility]) and underwent R ear tube in April . Since then she describes R otorrhea, occasional otalgia, and plugging/popping in her ears   Has some left sided symptoms but generally mild   Had an audiogram she believes prior to the right ear tube   No other prior surgeries   Has year round allergies; Underwent allergy testing with only a few positive results. Takes claritin - PRN   Flonase - not really helping  Used astelin but finds this irritates nasal lining    Currently complaints of right otorrhea since last week.   Using Flonase  +intermittent otalgia, popping  She has difficulty using most drops due to sulfa allergy and causes burning      Past Medical History:  Past Medical History:   Diagnosis Date    Chronic pain of both knees 3/24/2021    Diabetes mellitus type 2, controlled ([address] 3/24/2021    Ear pressure 3/24/2021    Ear problems     ESRD (end stage renal disease) on dialysis ([facility] [address] 3/24/2021    GERD (gastroesophageal reflux disease) 3/24/2021    Gout 3/24/2021    HTN (hypertension) 3/24/2021    Hypercholesterolemia 3/24/2021    Obesity 3/24/2021    Otitis media     Reflux gastritis     Sinus drainage 3/24/2021    Sinus problem        Past Surgical History:   Past Surgical History:   Procedure Laterality Date    HX MYRINGOTOMY         Medications:   Current Outpatient Medications   Medication Instructions    ALPRAZolam (XANAX) 0.25 mg, Oral    amiodarone (PACERONE) 100 mg, DAILY    aspirin delayed-release 81 mg, Oral, DAILY    atorvastatin (LIPITOR) 40 mg, Oral, DAILY    azelastine (ASTELIN) 137 mcg (0.1 %) nasal spray 1 [place], Both Nostrils, DAILY    calcitRIOL (ROCALTROL) 0.25 mcg, Oral, DAILY    cetirizine (ZYRTEC) 10 mg, Oral, DAILY AS NEEDED    cloNIDine HCL (CATAPRES) 0.1 mg, Oral, 2 TIMES DAILY    clopidogreL (PLAVIX) 75 mg    famotidine (PEPCID) 40 mg, Oral, DAILY    fluticasone propionate (FLONASE) 50 mcg/actuation nasal spray 2 Sprays, Both Nostrils, DAILY    hydrOXYzine HCL (ATARAX) 10 mg, 3 TIMES DAILY AS NEEDED    sodium chloride (Saline Mist) 0.65 % nasal squeeze bottle 1 Spray, Both Nostrils, AS NEEDED       Allergies: Allergies   Allergen Reactions    Sulfa (Sulfonamide Antibiotics) Other (comments)       Social History:   Social History     Tobacco Use    Smoking status: Never Smoker    Smokeless tobacco: Never Used   Vaping Use    Vaping Use: Never used   Substance Use Topics    Alcohol use: Not Currently    Drug use: Never       Family History:  Family History   Problem Relation Age of Onset    Hypertension Other     Heart Disease Other     Diabetes Other        Review of Systems:  Consitutional: denies fever, excessive weight gain or loss. Eyes: denies diplopia, eye pain. Integumentary: denies new concerning skin lesions. Ears, Nose, Mouth, Throat: denies except as per HPI.   Endocrine: denies hot or cold intolerance, increased thirst.  Respiratory: denies cough, hemoptysis, wheezing  Gastrointestinal: denies trouble swallowing, nausea, emesis, regurgitation  Musculoskeletal: denies muscle weakness or wasting  Cardiovascular: denies chest pain, shortness of breath  Neurologic: denies seizures, numbness or tingling, syncope  Hematologic: denies easy bleeding or bruising    Physical Examination:   Vitals:    01/20/22 0927   BP: (!) 146/68   BP 1 Location: Right upper arm   BP Patient Position: Sitting   BP Cuff Size: Adult   Pulse: 72   Temp: 97.5 °F (36.4 °C)   TempSrc: Temporal   Resp: 18 Height: 5' 6\" (1.676 m)   Weight: 176 lb (79.8 kg)   SpO2: 99%         General: Comfortable, pleasant, appears stated age  Voice: Strong, speaking in full sentences, no stridor    Face: No masses or lesions, facial strength symmetric   Ears: External ears unremarkable. Left ear canal clear. Tympanic membrane clear and intact, with visible landmarks. Clear middle ear space. Right TM with patent tube and notable continuous mucoid otorrhea. Nose: External nose unremarkable. Dorsum midline. Anterior rhinoscopy demonstrates no lesions. Septum midline. Turbinates without hypertrophy. Oral Cavity / Oropharynx: No trismus. Mucosa pink and moist. No lesions. Tongue is midline and mobile. Palate elevates symmetrically. Uvula midline. Tonsils unremarkable. Base of tongue soft. Floor of mouth soft. Neck: Supple. No adenopathy. Thyroid unremarkable. Palpable laryngeal landmarks. Full neck range of motion   Neurologic: CN II - XI intact. Normal gait      Assessment and Plan:   1. Right otorrhea   2. Right ETD  3. History of right myringotomy tube    -Applied Boric Acid today.  -Instructed to use Floxin as needed for drainage until next visit. -Dry ear precautions.  -Avoid manipulation.  -Return to office with Yany to discuss possible tube removal and/or replacement and nasal endoscopy.           Holly Jacobs MSN, FNP-C  [facility]  [address] [place] [address]  Office Phone: [phone]

## 2022-01-20 NOTE — TELEPHONE ENCOUNTER
Pt called stating that she went to the pharmacy and they told her that she needed to get a new prescription written for the Ofloxin

## 2022-01-20 NOTE — PROGRESS NOTES
Visit Vitals  BP (!) 146/68 (BP 1 Location: Right upper arm, BP Patient Position: Sitting, BP Cuff Size: Adult)   Pulse 72   Temp 97.5 °F (36.4 °C) (Temporal)   Resp 18   Ht 5' 6\" (1.676 m)   Wt 176 lb (79.8 kg)   SpO2 99%   BMI 28.41 kg/m²     Chief Complaint   Patient presents with   Norberto Stager Establish Care     Tube put in right ear several months ago and is now causing pain, popping, drainage and ringing. 1. Have you been to the ER, urgent care clinic since your last visit? Hospitalized since your last visit? No    2. Have you seen or consulted any other health care providers outside of the [address] [place] [address] since your last visit? Include any pap smears or colon screening.  No

## 2022-02-08 ENCOUNTER — OFFICE VISIT (OUTPATIENT)
Dept: ENT CLINIC | Age: 67
End: 2022-02-08
Payer: MEDICARE

## 2022-02-08 VITALS
SYSTOLIC BLOOD PRESSURE: 134 MMHG | HEIGHT: 66 IN | RESPIRATION RATE: 18 BRPM | TEMPERATURE: 97.4 F | DIASTOLIC BLOOD PRESSURE: 72 MMHG | WEIGHT: 176 LBS | BODY MASS INDEX: 28.28 KG/M2 | HEART RATE: 62 BPM | OXYGEN SATURATION: 100 %

## 2022-02-08 DIAGNOSIS — H92.11 OTORRHEA, RIGHT EAR: ICD-10-CM

## 2022-02-08 DIAGNOSIS — H69.81 ETD (EUSTACHIAN TUBE DYSFUNCTION), RIGHT: ICD-10-CM

## 2022-02-08 DIAGNOSIS — Z99.2 ESRD (END STAGE RENAL DISEASE) ON DIALYSIS (HCC): Primary | ICD-10-CM

## 2022-02-08 DIAGNOSIS — N18.6 ESRD (END STAGE RENAL DISEASE) ON DIALYSIS (HCC): Primary | ICD-10-CM

## 2022-02-08 PROCEDURE — 99214 OFFICE O/P EST MOD 30 MIN: CPT | Performed by: OTOLARYNGOLOGY

## 2022-02-08 RX ORDER — CIPROFLOXACIN AND DEXAMETHASONE 3; 1 MG/ML; MG/ML
4 SUSPENSION/ DROPS AURICULAR (OTIC) 2 TIMES DAILY
Qty: 7.5 ML | Refills: 1 | Status: SHIPPED | OUTPATIENT
Start: 2022-02-08 | End: 2022-03-15 | Stop reason: SDUPTHER

## 2022-02-08 RX ORDER — SEVELAMER CARBONATE 800 MG/1
3 TABLET, FILM COATED ORAL
COMMUNITY
Start: 2021-04-12

## 2022-02-08 NOTE — PROGRESS NOTES
Visit Vitals  /72 (BP 1 Location: Right upper arm, BP Patient Position: Sitting, BP Cuff Size: Adult)   Pulse 62   Temp 97.4 °F (36.3 °C) (Temporal)   Resp 18   Ht 5' 6\" (1.676 m)   Wt 176 lb (79.8 kg)   SpO2 100%   BMI 28.41 kg/m²     Chief Complaint   Patient presents with   [address] right ear tube and possible removal

## 2022-02-08 NOTE — PROGRESS NOTES
Otolaryngology-Head and Neck Surgery  Follow Up Patient Visit     Patient: Joselin Gallardo  YOB: 1955  MRN: [id number]  Date of Service:  2/8/2022    Chief Complaint: Right otorrhea    History of Present Illness: Joselin Gallardo is a 78y.o. year old female who was last seen 10/26/21 by Dr. Mandeep High for right otorrhea. she presents today for   Notes a history of recurring ear infections and redness in her right ear. Was seen by another ENT (in [facility]) and underwent R ear tube in April . Since then she describes R otorrhea, occasional otalgia, and plugging/popping in her ears   Has some left sided symptoms but generally mild   Had an audiogram she believes prior to the right ear tube   No other prior surgeries   Has year round allergies; Underwent allergy testing with only a few positive results. Takes claritin - PRN   Flonase - not really helping  Used astelin but finds this irritates nasal lining    Currently complaints of right otorrhea since last week.   Using Flonase  +intermittent otalgia, popping  She has difficulty using most drops due to sulfa allergy and causes burning      2/8/22 - fu right ear - still having some otorrhea, c/o popping/tinnitus; she has been on oflox recently does not seem to be helping    Past Medical History:  Past Medical History:   Diagnosis Date    Chronic pain of both knees 3/24/2021    Diabetes mellitus type 2, controlled ([address] 3/24/2021    Ear pressure 3/24/2021    Ear problems     ESRD (end stage renal disease) on dialysis ([address] 3/24/2021    GERD (gastroesophageal reflux disease) 3/24/2021    Gout 3/24/2021    HTN (hypertension) 3/24/2021    Hypercholesterolemia 3/24/2021    Obesity 3/24/2021    Otitis media     Reflux gastritis     Sinus drainage 3/24/2021    Sinus problem        Past Surgical History:   Past Surgical History:   Procedure Laterality Date    HX MYRINGOTOMY         Medications:   Current Outpatient Medications   Medication Instructions    ALPRAZolam (XANAX) 0.25 mg, Oral    aspirin delayed-release 81 mg, Oral, DAILY    atorvastatin (LIPITOR) 40 mg, DAILY    azelastine (ASTELIN) 137 mcg (0.1 %) nasal spray 1 [place], Both Nostrils, DAILY    calcitRIOL (ROCALTROL) 0.25 mcg, Oral, DAILY    ciprofloxacin-dexamethasone (CIPRODEX) 0.3-0.1 % otic suspension 4 Drops, Right Ear, 2 TIMES DAILY    cloNIDine HCL (CATAPRES) 0.1 mg, Oral, 2 TIMES DAILY    famotidine (PEPCID) 40 mg, Oral, DAILY    fluticasone propionate (FLONASE) 50 mcg/actuation nasal spray 2 Sprays, Both Nostrils, DAILY    sevelamer carbonate (RENVELA) 800 mg tab tab 3 Tablets, Oral       Allergies: Allergies   Allergen Reactions    Sulfa (Sulfonamide Antibiotics) Other (comments)       Social History:   Social History     Tobacco Use    Smoking status: Never Smoker    Smokeless tobacco: Never Used   Vaping Use    Vaping Use: Never used   Substance Use Topics    Alcohol use: Not Currently    Drug use: Never       Family History:  Family History   Problem Relation Age of Onset    Hypertension Other     Heart Disease Other     Diabetes Other        Review of Systems:  Consitutional: denies fever, excessive weight gain or loss. Eyes: denies diplopia, eye pain. Integumentary: denies new concerning skin lesions. Ears, Nose, Mouth, Throat: denies except as per HPI.   Endocrine: denies hot or cold intolerance, increased thirst.  Respiratory: denies cough, hemoptysis, wheezing  Gastrointestinal: denies trouble swallowing, nausea, emesis, regurgitation  Musculoskeletal: denies muscle weakness or wasting  Cardiovascular: denies chest pain, shortness of breath  Neurologic: denies seizures, numbness or tingling, syncope  Hematologic: denies easy bleeding or bruising    Physical Examination:   Vitals:    02/08/22 0852   BP: 134/72   BP 1 Location: Right upper arm   BP Patient Position: Sitting   BP Cuff Size: Adult   Pulse: 62   Temp: 97.4 °F (36.3 °C)   TempSrc: Temporal Resp: 18   Height: 5' 6\" (1.676 m)   Weight: 176 lb (79.8 kg)   SpO2: 100%         General: Comfortable, pleasant, appears stated age  Voice: Strong, speaking in full sentences, no stridor    Face: No masses or lesions, facial strength symmetric   Ears: External ears unremarkable. Left ear canal clear. Tympanic membrane clear and intact, with visible landmarks. Clear middle ear space. Right TM with patent tube and mucoid otorrhea. Nose: External nose unremarkable. Dorsum midline. Anterior rhinoscopy demonstrates no lesions. Septum midline. Turbinates without hypertrophy. Oral Cavity / Oropharynx: No trismus. Mucosa pink and moist. No lesions. Tongue is midline and mobile. Palate elevates symmetrically. Uvula midline. Tonsils unremarkable. Base of tongue soft. Floor of mouth soft. Neck: Supple. No adenopathy. Thyroid unremarkable. Palpable laryngeal landmarks. Full neck range of motion   Neurologic: CN II - XI intact. Normal gait      Assessment and Plan:   1. Right otorrhea   2. Right ETD  3. History of right myringotomy tube    -Applied Boric Acid today and ciprodex  -tube is still well within TM and would be a difficult removal with her drainage - we will trial a course of ciprodex and if not better then can consider attempt at removal  -Dry ear precautions.

## 2022-03-15 ENCOUNTER — OFFICE VISIT (OUTPATIENT)
Dept: ENT CLINIC | Age: 67
End: 2022-03-15
Payer: MEDICARE

## 2022-03-15 VITALS
SYSTOLIC BLOOD PRESSURE: 128 MMHG | TEMPERATURE: 97.3 F | BODY MASS INDEX: 28.28 KG/M2 | HEIGHT: 66 IN | RESPIRATION RATE: 16 BRPM | DIASTOLIC BLOOD PRESSURE: 60 MMHG | WEIGHT: 176 LBS

## 2022-03-15 DIAGNOSIS — H92.11 OTORRHEA, RIGHT EAR: Primary | ICD-10-CM

## 2022-03-15 DIAGNOSIS — Z96.22 HISTORY OF TYMPANOSTOMY TUBE PLACEMENT: ICD-10-CM

## 2022-03-15 DIAGNOSIS — H69.81 ETD (EUSTACHIAN TUBE DYSFUNCTION), RIGHT: ICD-10-CM

## 2022-03-15 PROCEDURE — G9231 DOC ESRD DIA TRANS PREG: HCPCS | Performed by: OTOLARYNGOLOGY

## 2022-03-15 PROCEDURE — G8536 NO DOC ELDER MAL SCRN: HCPCS | Performed by: OTOLARYNGOLOGY

## 2022-03-15 PROCEDURE — 99213 OFFICE O/P EST LOW 20 MIN: CPT | Performed by: OTOLARYNGOLOGY

## 2022-03-15 PROCEDURE — 1101F PT FALLS ASSESS-DOCD LE1/YR: CPT | Performed by: OTOLARYNGOLOGY

## 2022-03-15 PROCEDURE — G8427 DOCREV CUR MEDS BY ELIG CLIN: HCPCS | Performed by: OTOLARYNGOLOGY

## 2022-03-15 PROCEDURE — G8419 CALC BMI OUT NRM PARAM NOF/U: HCPCS | Performed by: OTOLARYNGOLOGY

## 2022-03-15 PROCEDURE — 3017F COLORECTAL CA SCREEN DOC REV: CPT | Performed by: OTOLARYNGOLOGY

## 2022-03-15 PROCEDURE — 1090F PRES/ABSN URINE INCON ASSESS: CPT | Performed by: OTOLARYNGOLOGY

## 2022-03-15 PROCEDURE — G8400 PT W/DXA NO RESULTS DOC: HCPCS | Performed by: OTOLARYNGOLOGY

## 2022-03-15 PROCEDURE — G8510 SCR DEP NEG, NO PLAN REQD: HCPCS | Performed by: OTOLARYNGOLOGY

## 2022-03-15 RX ORDER — CIPROFLOXACIN AND DEXAMETHASONE 3; 1 MG/ML; MG/ML
4 SUSPENSION/ DROPS AURICULAR (OTIC) 2 TIMES DAILY
Qty: 7.5 ML | Refills: 1 | Status: SHIPPED | OUTPATIENT
Start: 2022-03-15

## 2022-03-15 NOTE — PROGRESS NOTES
Visit Vitals  /60 (BP 1 Location: Right upper arm, BP Patient Position: Sitting, BP Cuff Size: Large adult)   Temp 97.3 °F (36.3 °C) (Temporal)   Resp 16   Ht 5' 6\" (1.676 m)   Wt 176 lb (79.8 kg)   BMI 28.41 kg/m²     Chief Complaint   Patient presents with    Procedure

## 2022-03-15 NOTE — PROGRESS NOTES
Otolaryngology-Head and Neck Surgery  Follow Up Patient Visit     Patient: Blake Faye  YOB: 1955  MRN: [id number]  Date of Service:  3/15/2022    Chief Complaint: Right otorrhea    History of Present Illness: Blake Faye is a 78y.o. year old female who was last seen 10/26/21 by Dr. Jailene Smith for right otorrhea. she presents today for   Notes a history of recurring ear infections and redness in her right ear. Was seen by another ENT (in [facility]) and underwent R ear tube in April . Since then she describes R otorrhea, occasional otalgia, and plugging/popping in her ears   Has some left sided symptoms but generally mild   Had an audiogram she believes prior to the right ear tube   No other prior surgeries   Has year round allergies; Underwent allergy testing with only a few positive results. Takes claritin - PRN   Flonase - not really helping  Used astelin but finds this irritates nasal lining    Currently complaints of right otorrhea since last week. Using Flonase  +intermittent otalgia, popping  She has difficulty using most drops due to sulfa allergy and causes burning      2/8/22 - fu right ear - still having some otorrhea, c/o popping/tinnitus; she has been on oflox recently does not seem to be helping    3/15/2022 -1 month follow-up right otorrhea persist despite using Ciprodex.     Past Medical History:  Past Medical History:   Diagnosis Date    Chronic pain of both knees 3/24/2021    Diabetes mellitus type 2, controlled ([address] 3/24/2021    Ear pressure 3/24/2021    Ear problems     ESRD (end stage renal disease) on dialysis ([address] 3/24/2021    GERD (gastroesophageal reflux disease) 3/24/2021    Gout 3/24/2021    HTN (hypertension) 3/24/2021    Hypercholesterolemia 3/24/2021    Obesity 3/24/2021    Otitis media     Reflux gastritis     Sinus drainage 3/24/2021    Sinus problem        Past Surgical History:   Past Surgical History:   Procedure Laterality Date    HX MYRINGOTOMY         Medications:   Current Outpatient Medications   Medication Instructions    ALPRAZolam (XANAX) 0.25 mg, Oral    aspirin delayed-release 81 mg, Oral, DAILY    atorvastatin (LIPITOR) 40 mg, DAILY    azelastine (ASTELIN) 137 mcg (0.1 %) nasal spray 1 [place], Both Nostrils, DAILY    calcitRIOL (ROCALTROL) 0.25 mcg, Oral, DAILY    ciprofloxacin-dexamethasone (CIPRODEX) 0.3-0.1 % otic suspension 4 Drops, Right Ear, 2 TIMES DAILY    cloNIDine HCL (CATAPRES) 0.1 mg, Oral, 2 TIMES DAILY    famotidine (PEPCID) 40 mg, Oral, DAILY    fluticasone propionate (FLONASE) 50 mcg/actuation nasal spray 2 Sprays, Both Nostrils, DAILY    sevelamer carbonate (RENVELA) 800 mg tab tab 3 Tablets, Oral       Allergies: Allergies   Allergen Reactions    Sulfa (Sulfonamide Antibiotics) Other (comments)       Social History:   Social History     Tobacco Use    Smoking status: Never Smoker    Smokeless tobacco: Never Used   Vaping Use    Vaping Use: Never used   Substance Use Topics    Alcohol use: Not Currently    Drug use: Never       Family History:  Family History   Problem Relation Age of Onset    Hypertension Other     Heart Disease Other     Diabetes Other        Review of Systems:  Consitutional: denies fever, excessive weight gain or loss. Eyes: denies diplopia, eye pain. Integumentary: denies new concerning skin lesions. Ears, Nose, Mouth, Throat: denies except as per HPI.   Endocrine: denies hot or cold intolerance, increased thirst.  Respiratory: denies cough, hemoptysis, wheezing  Gastrointestinal: denies trouble swallowing, nausea, emesis, regurgitation  Musculoskeletal: denies muscle weakness or wasting  Cardiovascular: denies chest pain, shortness of breath  Neurologic: denies seizures, numbness or tingling, syncope  Hematologic: denies easy bleeding or bruising    Physical Examination:   Vitals:    03/15/22 0901   BP: 128/60   BP 1 Location: Right upper arm   BP Patient Position: Sitting BP Cuff Size: Large adult   Temp: 97.3 °F (36.3 °C)   TempSrc: Temporal   Resp: 16   Height: 5' 6\" (1.676 m)   Weight: 176 lb (79.8 kg)         General: Comfortable, pleasant, appears stated age  Voice: Strong, speaking in full sentences, no stridor    Face: No masses or lesions, facial strength symmetric   Ears: External ears unremarkable. Left ear canal clear. Tympanic membrane clear and intact, with visible landmarks. Clear middle ear space. Right TM with patent tube and thin, mucoid otorrhea. Nose: External nose unremarkable. Dorsum midline. Anterior rhinoscopy demonstrates no lesions. Septum midline. Turbinates without hypertrophy. Oral Cavity / Oropharynx: No trismus. Mucosa pink and moist. No lesions. Tongue is midline and mobile. Palate elevates symmetrically. Uvula midline. Tonsils unremarkable. Base of tongue soft. Floor of mouth soft. Neck: Supple. No adenopathy. Thyroid unremarkable. Palpable laryngeal landmarks. Full neck range of motion   Neurologic: CN II - XI intact. Normal gait    Procedure:    Right ventilating tube removal.  Right ear was examined in the microscope. Drainage was suctioned. I applied phenol onto the tympanic membrane adjacent to the tympanostomy tube. Relaxing incision is made with myringotomy knife. I then used a Soliman needle to remove the tympanostomy tube from the tympanic membrane. Tube was fully extracted with alligator forceps. Ciprodex drops were placed. Procedure tolerated well. Assessment and Plan:   1. Right otorrhea   2. Right ETD  3. History of right myringotomy tube    Chronic ongoing tube otorrhea. Refractory to Ciprodex and other conservative measures. Tube is able to be removed today. She will do Ciprodex drops for 1 more week and I will see her back in 4 weeks.

## 2022-03-18 PROBLEM — G89.29 CHRONIC PAIN OF BOTH KNEES: Status: ACTIVE | Noted: 2021-03-24

## 2022-03-18 PROBLEM — M25.562 CHRONIC PAIN OF BOTH KNEES: Status: ACTIVE | Noted: 2021-03-24

## 2022-03-18 PROBLEM — M25.561 CHRONIC PAIN OF BOTH KNEES: Status: ACTIVE | Noted: 2021-03-24

## 2022-03-18 PROBLEM — E78.00 HYPERCHOLESTEROLEMIA: Status: ACTIVE | Noted: 2021-03-24

## 2022-03-19 PROBLEM — E11.9 DIABETES MELLITUS TYPE 2, CONTROLLED (HCC): Status: ACTIVE | Noted: 2021-03-24

## 2022-03-19 PROBLEM — H93.8X9 EAR PRESSURE: Status: ACTIVE | Noted: 2021-03-24

## 2022-03-19 PROBLEM — Z99.2 ESRD (END STAGE RENAL DISEASE) ON DIALYSIS (HCC): Status: ACTIVE | Noted: 2021-03-24

## 2022-03-19 PROBLEM — I10 HTN (HYPERTENSION): Status: ACTIVE | Noted: 2021-03-24

## 2022-03-19 PROBLEM — K21.9 GERD (GASTROESOPHAGEAL REFLUX DISEASE): Status: ACTIVE | Noted: 2021-03-24

## 2022-03-19 PROBLEM — E66.9 OBESITY: Status: ACTIVE | Noted: 2021-03-24

## 2022-03-19 PROBLEM — J34.89 SINUS DRAINAGE: Status: ACTIVE | Noted: 2021-03-24

## 2022-03-19 PROBLEM — N18.6 ESRD (END STAGE RENAL DISEASE) ON DIALYSIS (HCC): Status: ACTIVE | Noted: 2021-03-24

## 2022-03-19 PROBLEM — Z86.69 HISTORY OF RECURRENT EAR INFECTION: Status: ACTIVE | Noted: 2021-03-24

## 2022-03-19 PROBLEM — M10.9 GOUT: Status: ACTIVE | Noted: 2021-03-24

## 2022-04-19 ENCOUNTER — OFFICE VISIT (OUTPATIENT)
Dept: ENT CLINIC | Age: 67
End: 2022-04-19
Payer: MEDICARE

## 2022-04-19 VITALS
HEART RATE: 72 BPM | WEIGHT: 176 LBS | OXYGEN SATURATION: 98 % | BODY MASS INDEX: 28.28 KG/M2 | SYSTOLIC BLOOD PRESSURE: 140 MMHG | HEIGHT: 66 IN | RESPIRATION RATE: 17 BRPM | DIASTOLIC BLOOD PRESSURE: 80 MMHG

## 2022-04-19 DIAGNOSIS — H93.11 TINNITUS OF RIGHT EAR: ICD-10-CM

## 2022-04-19 DIAGNOSIS — H92.11 OTORRHEA, RIGHT EAR: Primary | ICD-10-CM

## 2022-04-19 PROCEDURE — 3017F COLORECTAL CA SCREEN DOC REV: CPT | Performed by: OTOLARYNGOLOGY

## 2022-04-19 PROCEDURE — G9231 DOC ESRD DIA TRANS PREG: HCPCS | Performed by: OTOLARYNGOLOGY

## 2022-04-19 PROCEDURE — G8419 CALC BMI OUT NRM PARAM NOF/U: HCPCS | Performed by: OTOLARYNGOLOGY

## 2022-04-19 PROCEDURE — 1090F PRES/ABSN URINE INCON ASSESS: CPT | Performed by: OTOLARYNGOLOGY

## 2022-04-19 PROCEDURE — G8510 SCR DEP NEG, NO PLAN REQD: HCPCS | Performed by: OTOLARYNGOLOGY

## 2022-04-19 PROCEDURE — 99213 OFFICE O/P EST LOW 20 MIN: CPT | Performed by: OTOLARYNGOLOGY

## 2022-04-19 PROCEDURE — 69420 INCISION OF EARDRUM: CPT | Performed by: OTOLARYNGOLOGY

## 2022-04-19 PROCEDURE — G8536 NO DOC ELDER MAL SCRN: HCPCS | Performed by: OTOLARYNGOLOGY

## 2022-04-19 PROCEDURE — 1101F PT FALLS ASSESS-DOCD LE1/YR: CPT | Performed by: OTOLARYNGOLOGY

## 2022-04-19 PROCEDURE — G8427 DOCREV CUR MEDS BY ELIG CLIN: HCPCS | Performed by: OTOLARYNGOLOGY

## 2022-04-19 PROCEDURE — G8400 PT W/DXA NO RESULTS DOC: HCPCS | Performed by: OTOLARYNGOLOGY

## 2022-04-19 NOTE — PROGRESS NOTES
Visit Vitals  Blood Pressure (Abnormal) 140/80 (BP 1 Location: Left upper arm, BP Patient Position: Sitting, BP Cuff Size: Adult)   Pulse 72   Respiration 17   Height 5' 6\" (1.676 m)   Weight 176 lb (79.8 kg)   Oxygen Saturation 98%   Body Mass Index 28.41 kg/m²     Chief Complaint   Patient presents with    Follow-up     Otorrhea, right ear

## 2022-04-19 NOTE — PROGRESS NOTES
Otolaryngology-Head and Neck Surgery  Follow Up Patient Visit     Patient: Shruti Handy  YOB: 1955  MRN: [id number]  Date of Service:  4/19/2022    Chief Complaint: Right otorrhea    History of Present Illness: Shruti Handy is a 78y.o. year old female who was last seen 10/26/21 by Dr. Micaela Narayan for right otorrhea. she presents today for   Notes a history of recurring ear infections and redness in her right ear. Was seen by another ENT (in [facility]) and underwent R ear tube in April . Since then she describes R otorrhea, occasional otalgia, and plugging/popping in her ears   Has some left sided symptoms but generally mild   Had an audiogram she believes prior to the right ear tube   No other prior surgeries   Has year round allergies; Underwent allergy testing with only a few positive results. Takes claritin - PRN   Flonase - not really helping  Used astelin but finds this irritates nasal lining    Currently complaints of right otorrhea since last week. Using Flonase  +intermittent otalgia, popping  She has difficulty using most drops due to sulfa allergy and causes burning    2/8/22 - fu right ear - still having some otorrhea, c/o popping/tinnitus; she has been on oflox recently does not seem to be helping    3/15/2022 -1 month follow-up right otorrhea persist despite using Ciprodex. 4/19/2022 -1 month follow-up she reports no further drainage from the ear but she is complaining of tinnitus in the knocking feeling in the ear this has been persistent. He has never really been resolved even since before she had a tympanostomy tube placed.   Her ear does continue to drain after the tube was placed for the symptoms were never better    Past Medical History:  Past Medical History:   Diagnosis Date    Chronic pain of both knees 3/24/2021    Diabetes mellitus type 2, controlled ([address] 3/24/2021    Ear pressure 3/24/2021    Ear problems     ESRD (end stage renal disease) on dialysis ([address] 3/24/2021    GERD (gastroesophageal reflux disease) 3/24/2021    Gout 3/24/2021    HTN (hypertension) 3/24/2021    Hypercholesterolemia 3/24/2021    Obesity 3/24/2021    Otitis media     Reflux gastritis     Sinus drainage 3/24/2021    Sinus problem        Past Surgical History:   Past Surgical History:   Procedure Laterality Date    HX MYRINGOTOMY         Medications:   Current Outpatient Medications   Medication Instructions    ALPRAZolam (XANAX) 0.25 mg, Oral    aspirin delayed-release 81 mg, Oral, DAILY    atorvastatin (LIPITOR) 40 mg, DAILY    azelastine (ASTELIN) 137 mcg (0.1 %) nasal spray 1 [place], Both Nostrils, DAILY    calcitRIOL (ROCALTROL) 0.25 mcg, Oral, DAILY    ciprofloxacin-dexamethasone (CIPRODEX) 0.3-0.1 % otic suspension 4 Drops, Right Ear, 2 TIMES DAILY    cloNIDine HCL (CATAPRES) 0.1 mg, Oral, 2 TIMES DAILY    famotidine (PEPCID) 40 mg, Oral, DAILY    fluticasone propionate (FLONASE) 50 mcg/actuation nasal spray 2 Sprays, Both Nostrils, DAILY    sevelamer carbonate (RENVELA) 800 mg tab tab 3 Tablets, Oral       Allergies: Allergies   Allergen Reactions    Sulfa (Sulfonamide Antibiotics) Other (comments)       Social History:   Social History     Tobacco Use    Smoking status: Never Smoker    Smokeless tobacco: Never Used   Vaping Use    Vaping Use: Never used   Substance Use Topics    Alcohol use: Not Currently    Drug use: Never       Family History:  Family History   Problem Relation Age of Onset    Hypertension Other     Heart Disease Other     Diabetes Other        Review of Systems:  Consitutional: denies fever, excessive weight gain or loss. Eyes: denies diplopia, eye pain. Integumentary: denies new concerning skin lesions. Ears, Nose, Mouth, Throat: denies except as per HPI.   Endocrine: denies hot or cold intolerance, increased thirst.  Respiratory: denies cough, hemoptysis, wheezing  Gastrointestinal: denies trouble swallowing, nausea, emesis, regurgitation  Musculoskeletal: denies muscle weakness or wasting  Cardiovascular: denies chest pain, shortness of breath  Neurologic: denies seizures, numbness or tingling, syncope  Hematologic: denies easy bleeding or bruising    Physical Examination:   Vitals:    04/19/22 0940   BP: (!) 140/80   BP 1 Location: Left upper arm   BP Patient Position: Sitting   BP Cuff Size: Adult   Pulse: 72   Resp: 17   Height: 5' 6\" (1.676 m)   Weight: 176 lb (79.8 kg)   SpO2: 98%         General: Comfortable, pleasant, appears stated age  Voice: Strong, speaking in full sentences, no stridor    Face: No masses or lesions, facial strength symmetric   Ears: External ears unremarkable. Left ear canal clear. Tympanic membrane clear and intact, with visible landmarks. Clear middle ear space. Right TM is intact, prior myringotomy site is healed. There is a clear pulsatile effusion. Nose: External nose unremarkable. Dorsum midline. Anterior rhinoscopy demonstrates no lesions. Septum midline. Turbinates without hypertrophy. Oral Cavity / Oropharynx: No trismus. Mucosa pink and moist. No lesions. Tongue is midline and mobile. Palate elevates symmetrically. Uvula midline. Tonsils unremarkable. Base of tongue soft. Floor of mouth soft. Neck: Supple. No adenopathy. Thyroid unremarkable. Palpable laryngeal landmarks. Full neck range of motion   Neurologic: CN II - XI intact. Normal gait    Procedure:  Right tympanocentesis. Right ear examined in the microscope. Small amount of phenol was applied on the tympanic membrane at the prior site of myringotomy which is now healed. I then used the 20-gauge spinal needle and aspirated around 1 cc of fluid from the middle ear, completely clear and watery. Procedure tolerated well. Assessment and Plan:   1. Right conductive hearing loss and tinnitus  2. Question right CSF otorrhea    After tube removal her TM has healed and now a clear watery effusion persists.   Today we did a tympanocentesis, got around 1 cc of clear fluid I will send this for beta-2 transferrin. Keep ear dry. Follow-up in 4 weeks I will call with results.

## 2022-04-28 LAB — B2 TRANSFERRIN FLD QL: ABNORMAL

## 2022-05-02 DIAGNOSIS — G96.01 CSF OTORRHEA: Primary | ICD-10-CM

## 2022-05-02 NOTE — PROGRESS NOTES
Discussed result with patient. She has fluid in right middle ear suggestive of CSF. Will need CT to assess for bony dehiscence and will set up referral to [facility] otology. Pt agrees.

## 2022-05-17 ENCOUNTER — TELEPHONE (OUTPATIENT)
Dept: ENT CLINIC | Age: 67
End: 2022-05-17

## 2022-05-17 NOTE — TELEPHONE ENCOUNTER
Pt called inquiring about a sooner appt with Dr. Yobani Park, I informed her that her current appt is the soonest available and that I would add her to the wait list.     Pt called stating her head is bothering her and that she is very dizzy. She states that she been going back and forth to the ER for this and that they prescribed prednisone. However, the pt states that the prednisone makes her feel sick. The pt stated she would like a call back from the nurse in reference to this and would like recommendations for what to do in the meantime. Please advise.

## 2022-05-31 ENCOUNTER — OFFICE VISIT (OUTPATIENT)
Dept: ENT CLINIC | Age: 67
End: 2022-05-31
Payer: MEDICARE

## 2022-05-31 VITALS
HEART RATE: 67 BPM | RESPIRATION RATE: 18 BRPM | BODY MASS INDEX: 28.28 KG/M2 | DIASTOLIC BLOOD PRESSURE: 80 MMHG | HEIGHT: 66 IN | OXYGEN SATURATION: 99 % | WEIGHT: 176 LBS | SYSTOLIC BLOOD PRESSURE: 130 MMHG

## 2022-05-31 DIAGNOSIS — N18.6 ESRD (END STAGE RENAL DISEASE) ON DIALYSIS (HCC): ICD-10-CM

## 2022-05-31 DIAGNOSIS — G96.01 CSF OTORRHEA: Primary | ICD-10-CM

## 2022-05-31 DIAGNOSIS — H92.11 OTORRHEA, RIGHT EAR: ICD-10-CM

## 2022-05-31 DIAGNOSIS — Z99.2 ESRD (END STAGE RENAL DISEASE) ON DIALYSIS (HCC): ICD-10-CM

## 2022-05-31 DIAGNOSIS — H90.71 MIXED CONDUCTIVE AND SENSORINEURAL HEARING LOSS OF RIGHT EAR, UNSPECIFIED HEARING STATUS ON CONTRALATERAL SIDE: ICD-10-CM

## 2022-05-31 PROCEDURE — 1101F PT FALLS ASSESS-DOCD LE1/YR: CPT | Performed by: OTOLARYNGOLOGY

## 2022-05-31 PROCEDURE — 1123F ACP DISCUSS/DSCN MKR DOCD: CPT | Performed by: OTOLARYNGOLOGY

## 2022-05-31 PROCEDURE — 99214 OFFICE O/P EST MOD 30 MIN: CPT | Performed by: OTOLARYNGOLOGY

## 2022-05-31 PROCEDURE — G8427 DOCREV CUR MEDS BY ELIG CLIN: HCPCS | Performed by: OTOLARYNGOLOGY

## 2022-05-31 PROCEDURE — G8510 SCR DEP NEG, NO PLAN REQD: HCPCS | Performed by: OTOLARYNGOLOGY

## 2022-05-31 PROCEDURE — G8536 NO DOC ELDER MAL SCRN: HCPCS | Performed by: OTOLARYNGOLOGY

## 2022-05-31 PROCEDURE — 3017F COLORECTAL CA SCREEN DOC REV: CPT | Performed by: OTOLARYNGOLOGY

## 2022-05-31 PROCEDURE — G8419 CALC BMI OUT NRM PARAM NOF/U: HCPCS | Performed by: OTOLARYNGOLOGY

## 2022-05-31 PROCEDURE — G9231 DOC ESRD DIA TRANS PREG: HCPCS | Performed by: OTOLARYNGOLOGY

## 2022-05-31 PROCEDURE — G8400 PT W/DXA NO RESULTS DOC: HCPCS | Performed by: OTOLARYNGOLOGY

## 2022-05-31 PROCEDURE — 1090F PRES/ABSN URINE INCON ASSESS: CPT | Performed by: OTOLARYNGOLOGY

## 2022-05-31 NOTE — LETTER
5/31/2022    Patient: Piedad Medina   YOB: 1955   Date of Visit: 5/31/2022     Sami Townsend MD  [address] [id number]  Via Fax: [phone]    Dear Sami Townsend MD,      Thank you for referring Ms. Carrie Basilio to [facility] EAR NOSE [facility] THROAT [facility]AT [facility]ER[facility] for evaluation. My notes for this consultation are attached. If you have questions, please do not hesitate to call me. I look forward to following your patient along with you.       Sincerely,    Josué Cordero MD

## 2022-05-31 NOTE — PROGRESS NOTES
Otolaryngology-Head and Neck Surgery  Follow Up Patient Visit     Patient: Elke Whitman  YOB: 1955  MRN: [id number]  Date of Service:  5/31/2022    Chief Complaint: Right otorrhea    History of Present Illness: Elke Whitman is a 78y.o. year old female who was last seen 10/26/21 by Dr. Trey Pal for right otorrhea. she presents today for   Notes a history of recurring ear infections and redness in her right ear. Was seen by another ENT (in [facility]) and underwent R ear tube in April . Since then she describes R otorrhea, occasional otalgia, and plugging/popping in her ears   Has some left sided symptoms but generally mild   Had an audiogram she believes prior to the right ear tube   No other prior surgeries   Has year round allergies; Underwent allergy testing with only a few positive results. Takes claritin - PRN   Flonase - not really helping  Used astelin but finds this irritates nasal lining    Currently complaints of right otorrhea since last week. Using Flonase  +intermittent otalgia, popping  She has difficulty using most drops due to sulfa allergy and causes burning    2/8/22 - fu right ear - still having some otorrhea, c/o popping/tinnitus; she has been on oflox recently does not seem to be helping    3/15/2022 -1 month follow-up right otorrhea persist despite using Ciprodex. 4/19/2022 -1 month follow-up she reports no further drainage from the ear but she is complaining of tinnitus in the knocking feeling in the ear this has been persistent. He has never really been resolved even since before she had a tympanostomy tube placed. Her ear does continue to drain after the tube was placed for the symptoms were never better    5/31/2022 -no change in symptoms. No otorrhea. Still complains of a knocking sound in her ear. Seems worse when she is postdialysis. Has her appointment with [facility] in July. She does not recall being notified regarding her CT scan.     Past Medical History:  Past Medical History:   Diagnosis Date    Chronic pain of both knees 3/24/2021    Diabetes mellitus type 2, controlled ([facility] 3/24/2021    Ear pressure 3/24/2021    Ear problems     ESRD (end stage renal disease) on dialysis ([facility] 3/24/2021    GERD (gastroesophageal reflux disease) 3/24/2021    Gout 3/24/2021    HTN (hypertension) 3/24/2021    Hypercholesterolemia 3/24/2021    Obesity 3/24/2021    Otitis media     Reflux gastritis     Sinus drainage 3/24/2021    Sinus problem        Past Surgical History:   Past Surgical History:   Procedure Laterality Date    HX MYRINGOTOMY         Medications:   Current Outpatient Medications   Medication Instructions    ALPRAZolam (XANAX) 0.25 mg, Oral    aspirin delayed-release 81 mg, Oral, DAILY    atorvastatin (LIPITOR) 40 mg, DAILY    azelastine (ASTELIN) 137 mcg (0.1 %) nasal spray 1 [place], Both Nostrils, DAILY    calcitRIOL (ROCALTROL) 0.25 mcg, Oral, DAILY    ciprofloxacin-dexamethasone (CIPRODEX) 0.3-0.1 % otic suspension 4 Drops, Right Ear, 2 TIMES DAILY    cloNIDine HCL (CATAPRES) 0.1 mg, Oral, 2 TIMES DAILY    famotidine (PEPCID) 40 mg, Oral, DAILY    fluticasone propionate (FLONASE) 50 mcg/actuation nasal spray 2 Sprays, Both Nostrils, DAILY    sevelamer carbonate (RENVELA) 800 mg tab tab 3 Tablets, Oral       Allergies: Allergies   Allergen Reactions    Sulfa (Sulfonamide Antibiotics) Other (comments)       Social History:   Social History     Tobacco Use    Smoking status: Never Smoker    Smokeless tobacco: Never Used   Vaping Use    Vaping Use: Never used   Substance Use Topics    Alcohol use: Not Currently    Drug use: Never       Family History:  Family History   Problem Relation Age of Onset    Hypertension Other     Heart Disease Other     Diabetes Other        Review of Systems:  Consitutional: denies fever, excessive weight gain or loss. Eyes: denies diplopia, eye pain.   Integumentary: denies new concerning skin lesions. Ears, Nose, Mouth, Throat: denies except as per HPI. Endocrine: denies hot or cold intolerance, increased thirst.  Respiratory: denies cough, hemoptysis, wheezing  Gastrointestinal: denies trouble swallowing, nausea, emesis, regurgitation  Musculoskeletal: denies muscle weakness or wasting  Cardiovascular: denies chest pain, shortness of breath  Neurologic: denies seizures, numbness or tingling, syncope  Hematologic: denies easy bleeding or bruising    Physical Examination:   Vitals:    05/31/22 1024   BP: 130/80   BP 1 Location: Left upper arm   BP Patient Position: Sitting   BP Cuff Size: Adult   Pulse: 67   Resp: 18   Height: 5' 6\" (1.676 m)   Weight: 176 lb (79.8 kg)   SpO2: 99%         General: Comfortable, pleasant, appears stated age  Voice: Strong, speaking in full sentences, no stridor    Face: No masses or lesions, facial strength symmetric   Ears: External ears unremarkable. Left ear canal clear. Tympanic membrane clear and intact, with visible landmarks. Clear middle ear space. Right TM is intact, tympanocentesis site is healed. There is a clear pulsatile effusion. Nose: External nose unremarkable. Dorsum midline. Anterior rhinoscopy demonstrates no lesions. Septum midline. Turbinates without hypertrophy. Oral Cavity / Oropharynx: No trismus. Mucosa pink and moist. No lesions. Tongue is midline and mobile. Palate elevates symmetrically. Uvula midline. Tonsils unremarkable. Base of tongue soft. Floor of mouth soft. Neck: Supple. No adenopathy. Thyroid unremarkable. Palpable laryngeal landmarks. Full neck range of motion   Neurologic: CN II - XI intact. Normal gait       Dx: Otorrhea, right ear     1 Result Note    Component 4/19/22 0000   Beta-2 Transferrin See additional order Abnormal     Comment: Detected             Assessment and Plan:   1. Right conductive hearing loss and tinnitus  2. Right CSF otorrhea    From prior tympanocentesis the fluid was positive for beta-2 transferrin. She has right CSF otorrhea. No clear etiology. I will reorder CT scan temporal bone. She has appointment with [facility] in July. I will call her with CT results.

## 2022-06-13 ENCOUNTER — TELEPHONE (OUTPATIENT)
Dept: ENT CLINIC | Age: 67
End: 2022-06-13

## 2022-06-23 ENCOUNTER — HOSPITAL ENCOUNTER (OUTPATIENT)
Dept: CT IMAGING | Age: 67
Discharge: HOME | End: 2022-06-23
Attending: OTOLARYNGOLOGY
Payer: MEDICARE

## 2022-06-23 DIAGNOSIS — G96.01 CSF OTORRHEA: ICD-10-CM

## 2022-06-23 PROCEDURE — 70480 CT ORBIT/EAR/FOSSA W/O DYE: CPT

## 2022-06-27 NOTE — PROGRESS NOTES
Please contact radiology to have a disk printed for patient and if they can mail it to her as she  does not have transportation to come pick it up. She has appt with [facility] on July 14th, needs disk before that.   Thanks,

## 2023-05-25 RX ORDER — ALPRAZOLAM 0.25 MG/1
0.25 TABLET ORAL
COMMUNITY

## 2023-05-25 RX ORDER — AZELASTINE 1 MG/ML
1 SPRAY, METERED NASAL DAILY
COMMUNITY
Start: 2021-07-13

## 2023-05-25 RX ORDER — ASPIRIN 81 MG/1
81 TABLET ORAL DAILY
COMMUNITY

## 2023-05-25 RX ORDER — ATORVASTATIN CALCIUM 40 MG/1
40 TABLET, FILM COATED ORAL DAILY
COMMUNITY

## 2023-05-25 RX ORDER — SEVELAMER CARBONATE 800 MG/1
3 TABLET, FILM COATED ORAL
COMMUNITY
Start: 2021-04-12

## 2023-05-25 RX ORDER — FLUTICASONE PROPIONATE 50 MCG
2 SPRAY, SUSPENSION (ML) NASAL DAILY
COMMUNITY
Start: 2021-07-13

## 2023-05-25 RX ORDER — FAMOTIDINE 40 MG/1
40 TABLET, FILM COATED ORAL DAILY
COMMUNITY

## 2023-05-25 RX ORDER — CLONIDINE HYDROCHLORIDE 0.1 MG/1
0.1 TABLET ORAL 2 TIMES DAILY
COMMUNITY

## 2023-05-25 RX ORDER — CALCITRIOL 0.25 UG/1
0.25 CAPSULE, LIQUID FILLED ORAL DAILY
COMMUNITY

## 2023-05-25 RX ORDER — CIPROFLOXACIN AND DEXAMETHASONE 3; 1 MG/ML; MG/ML
4 SUSPENSION/ DROPS AURICULAR (OTIC) 2 TIMES DAILY
COMMUNITY
Start: 2022-03-15